# Patient Record
(demographics unavailable — no encounter records)

---

## 2024-10-11 NOTE — HISTORY OF PRESENT ILLNESS
[TextBox_4] : AYLIN is here today for evaluation.  He was born at term after an unassisted conception and uneventful pregnancy and delivery.  A deformity of the penis was detected in the nursery which prevented circumcision as . Making ample wet diapers.  No infections.

## 2024-10-11 NOTE — PHYSICAL EXAM
[TextBox_92] : PENIS: Straight uncircumcised penis with phimosis. Meatus not visible.     SCROTUM: Bilaterally symmetric testes in dependent position without palpable mass, hernia or hydrocele

## 2024-10-11 NOTE — CONSULT LETTER
[FreeTextEntry1] : Dear Dr. MATIAS CUNNINGHAM ,  I had the pleasure of consulting on AYLIN FOLEY today.  Below is my note regarding the office visit today.  Thank you so very much for allowing me to participate in AYLIN's  care.  Please don't hesitate to call me should any questions or issues arise .  Sincerely,   Andrew Joel MD, FACS, [facility] Chief, Pediatric Urology Professor of Urology and Pediatrics [facility] [employer]  President, [employer] - [place] Section Past-President, Societies for Pediatric Urology

## 2024-10-31 NOTE — CONSULT LETTER
[FreeTextEntry1] : Dear Dr. MATIAS CUNNINGHAM ,  I had the pleasure of consulting on AYLIN FOLEY today.  Below is my note regarding the office visit today.  Thank you so very much for allowing me to participate in AYLIN's  care.  Please don't hesitate to call me should any questions or issues arise .  Sincerely,   Andrew Joel MD, FACS, [facility] Chief, Pediatric Urology Professor of Urology and Pediatrics [facility] [employer]  President, [employer] - [place] Section Past-President, Societies for Pediatric Urology

## 2024-10-31 NOTE — CONSULT LETTER
[FreeTextEntry1] : Dear Dr. MATIAS CUNNINGHAM ,  I had the pleasure of consulting on AYLIN FOLEY today.  Below is my note regarding the office visit today.  Thank you so very much for allowing me to participate in AYLIN's  care.  Please don't hesitate to call me should any questions or issues arise .  Sincerely,   Andrew Joel MD, FACS, [facility] Chief, Pediatric Urology Professor of Urology and Pediatrics [facility] [employer]  President, [employer] - [place] Section Past-President, Societies [employer] Pediatric Urology

## 2024-10-31 NOTE — ASSESSMENT
[FreeTextEntry1] : AYLIN has phimosis; no abnormalities were noted today.  We discussed phimosis and its implications,  We then discussed the management options, including monitoring, use of steroids, and circumcision. The risks and benefits and possible complications of each option (including but not limited to reaction to steroids, bleeding, injury, incomplete circumcision and need for additional injury) was discussed and all questions were answered.  The decision for in office circumcision with EMLA was made.  We performed the procedure using a Plastibell that needs to fall off spontaneously or in the office if needed.  I reiterated the anticipated post-circumcision course and instructions.  All questions were answered.

## 2024-10-31 NOTE — CONSULT LETTER
[FreeTextEntry1] : Dear Dr. MATIAS CUNNINGHAM ,  I had the pleasure of consulting on AYLIN FOLEY today.  Below is my note regarding the office visit today.  Thank you so very much for allowing me to participate in AYLIN's  care.  Please don't hesitate to call me should any questions or issues arise .  Sincerely,   Andrew Joel MD, FACS, [facility] Chief, Pediatric Urology Professor of Urology and Pediatrics [facility] [employer]  President, [employer] - [place] Section Past-President, Societies [employer] Pediatric Urology

## 2024-10-31 NOTE — CONSULT LETTER
[FreeTextEntry1] : Dear Dr. MATIAS CUNNINGHAM ,  I had the pleasure of consulting on AYLIN FOLEY today.  Below is my note regarding the office visit today.  Thank you so very much for allowing me to participate in AYLIN's  care.  Please don't hesitate to call me should any questions or issues arise .  Sincerely,   Andrew Joel MD, FACS, [facility] Chief, Pediatric Urology Professor of Urology and Pediatrics [facility] [employer]  President, [employer] - [place] Section Past-President, Societies [employer] Pediatric Urology